# Patient Record
Sex: FEMALE | Race: BLACK OR AFRICAN AMERICAN | Employment: FULL TIME | URBAN - METROPOLITAN AREA
[De-identification: names, ages, dates, MRNs, and addresses within clinical notes are randomized per-mention and may not be internally consistent; named-entity substitution may affect disease eponyms.]

---

## 2020-12-02 ENCOUNTER — HOSPITAL ENCOUNTER (OUTPATIENT)
Dept: PHYSICAL THERAPY | Age: 30
Discharge: HOME | End: 2020-12-02
Payer: COMMERCIAL

## 2020-12-02 PROCEDURE — 97161 PT EVAL LOW COMPLEX 20 MIN: CPT | Performed by: PHYSICAL THERAPIST

## 2020-12-02 PROCEDURE — 97535 SELF CARE MNGMENT TRAINING: CPT | Performed by: PHYSICAL THERAPIST
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
## 2020-12-02 NOTE — PROGRESS NOTES
[facility] PHYSICAL THERAPY - DAILY TREATMENT NOTE    Patient Name: Carol Guerrero        Date: 2020  : 1990   yes Patient  Verified  Visit #:   1   of   9  Insurance: Payor: Britt Fink / Plan: Perfecto Mylo PPO / Product Type: PPO /      In time: 600 Out time: 630   Total Treatment Time: 30     Medicare/BCBS Time Tracking (below)   Total Timed Codes (min):  na 1:1 Treatment Time:  na     TREATMENT AREA =  Neck pain [M54.2]    SUBJECTIVE  Pain Level (on 0 to 10 scale):  7  / 10   Medication Changes/New allergies or changes in medical history, any new surgeries or procedures?    no  If yes, update Summary List   Subjective Functional Status/Changes:  []  No changes reported     See IE          OBJECTIVE        10 min Self Care: Activity modification, sleeping position    Rationale:    increase ROM and increase strength to improve the patients ability to complete adls    Billed With/As:   [] TE   [] TA   [] Neuro   [] Self Care Patient Education: [x] Review HEP    [] Progressed/Changed HEP based on:   [] positioning   [] body mechanics   [] transfers   [] heat/ice application    [] other:      Other Objective/Functional Measures:    Demo verbal understanding to [place]  See ie     Post Treatment Pain Level (on 0 to 10) scale:   7  / 10     ASSESSMENT  Assessment/Changes in Function:     See ie     []  See Progress Note/Recertification   Patient will continue to benefit from skilled PT services to modify and progress therapeutic interventions, address functional mobility deficits, address ROM deficits, address strength deficits, analyze and address soft tissue restrictions, analyze and cue movement patterns, analyze and modify body mechanics/ergonomics, assess and modify postural abnormalities and instruct in home and community integration to attain remaining goals.    Progress toward goals / Updated goals:    See ie     PLAN  []  Upgrade activities as tolerated yes Continue plan of care   []  Discharge due to :    [] Other:      Therapist: Emmett Livingston, PT    Date: 12/2/2020 Time: 6:40 PM     Future Appointments   Date Time Provider Marleny Ballesteros   12/7/2020  2:45 PM Roxana Weiner, PT [facility] [id number]   12/14/2020  8:30 AM John Napoles PTA [facility]   12/16/2020 11:45 AM Roxana Weiner, PT [facility]   1/4/2021  6:00 PM [facility]   1/6/2021  4:30 PM Roxana Weiner, PT [facility]   1/11/2021  6:00 PM Lasandra Collin [facility]   1/13/2021  4:30 PM Roxana Weiner, PT [facility]   1/18/2021  6:00 PM Roxana Weiner, PT [address]   1/20/2021  4:30 PM Jessica Hidalgo, PT [address]

## 2020-12-07 ENCOUNTER — HOSPITAL ENCOUNTER (OUTPATIENT)
Dept: PHYSICAL THERAPY | Age: 30
Discharge: HOME | End: 2020-12-07
Payer: COMMERCIAL

## 2020-12-07 PROCEDURE — 97140 MANUAL THERAPY 1/> REGIONS: CPT | Performed by: PHYSICAL THERAPIST

## 2020-12-07 PROCEDURE — 97110 THERAPEUTIC EXERCISES: CPT | Performed by: PHYSICAL THERAPIST

## 2020-12-07 NOTE — PROGRESS NOTES
[facility]   PHYSICAL THERAPY - DAILY TREATMENT NOTE    Patient Name: Eduardo Ramires        Date: 2020  : 1990   yes Patient  Verified  Visit #:   2   of   9  Insurance: Payor: Bhavin Rivera / Plan: Sury Haynes PPO / Product Type: PPO /      In time: 245 Out time: 333   Total Treatment Time: 48     Medicare/BCBS Time Tracking (below)   Total Timed Codes (min):  na 1:1 Treatment Time:  na     TREATMENT AREA =  Neck pain [M54.2]    SUBJECTIVE  Pain Level (on 0 to 10 scale):  7  / 10   Medication Changes/New allergies or changes in medical history, any new surgeries or procedures?    no  If yes, update Summary List   Subjective Functional Status/Changes:  []  No changes reported     I adjusted my monitor and have been trying to not make quick movements with my neck           OBJECTIVE  Modalities Rationale:     decrease pain and increase tissue extensibility to improve patient's ability to complete adls    min [] Estim, type/location:                                      []  att     []  unatt     []  w/US     []  w/ice    []  w/heat    min []  Mechanical Traction: type/lbs                   []  pro   []  sup   []  int   []  cont    []  before manual    []  after manual    min []  Ultrasound, settings/location:      min []  Iontophoresis w/ dexamethasone, location:                                               []  take home patch       []  in clinic   10 min [x]  Ice     []  Heat    location/position: Supine with bolster    min []  Vasopneumatic Device, press/temp:     min []  Other:    [x] Skin assessment post-treatment (if applicable):    [x]  intact    []  redness- no adverse reaction     []redness  adverse reaction:        12 min Therapeutic Exercise:  [x]  See flow sheet   Rationale:      increase ROM and increase strength to improve the patients ability to complete adls     26 min Manual Therapy: Mfr c/s paraspinals, ut, ls, scm, scalenes, sor   Rationale:      decrease pain, increase ROM and increase tissue extensibility to improve patient's ability to complete adls  The manual therapy interventions were performed at a separate and distinct time from the therapeutic activities interventions. Billed With/As:   [] TE   [] TA   [] Neuro   [] Self Care Patient Education: [x] Review HEP    [] Progressed/Changed HEP based on:   [] positioning   [] body mechanics   [] transfers   [] heat/ice application    [] other:      Other Objective/Functional Measures:    ttp along R SOR and B paraspinals, scm  Increased time for basic te to ensure form      Post Treatment Pain Level (on 0 to 10) scale:   6  / 10     ASSESSMENT  Assessment/Changes in Function:     No increase in pain following session but pt was advised on doms and demo verbal understanding of appropriate care     []  See Progress Note/Recertification   Patient will continue to benefit from skilled PT services to modify and progress therapeutic interventions, address functional mobility deficits, address ROM deficits, address strength deficits, analyze and address soft tissue restrictions, analyze and cue movement patterns, analyze and modify body mechanics/ergonomics, assess and modify postural abnormalities and instruct in home and community integration to attain remaining goals.    Progress toward goals / Updated goals:    Given hep - will assess compliance      PLAN  []  Upgrade activities as tolerated yes Continue plan of care   []  Discharge due to :    []  Other:      Therapist: Lacey Guillen PT    Date: 12/7/2020 Time: 1:54 PM     Future Appointments   Date Time Provider Marleny Ballesteros   12/7/2020  2:45 PM Gearlean Landau, PT [address]   12/14/2020  8:30 AM Naseem Mccoy PTA [facility]   12/16/2020 11:45 AM Gearlean Landau, PT [address]   1/4/2021  6:00 PM Luz Elena Oleksandr [facility]   1/6/2021  4:30 PM Gearlean Landau, PT [facility]   1/11/2021  6:00 PM Gearlean Landau, PT [facility]   1/13/2021  4:30 PM Alise Hidalgo, [address] 1/18/2021  6:00 PM Catalina Martínez, PT [facility]   1/20/2021  4:30 PM Bri Hidalgo, PT [facility]

## 2020-12-14 ENCOUNTER — HOSPITAL ENCOUNTER (OUTPATIENT)
Dept: PHYSICAL THERAPY | Age: 30
Discharge: HOME | End: 2020-12-14
Payer: COMMERCIAL

## 2020-12-14 PROCEDURE — 97110 THERAPEUTIC EXERCISES: CPT

## 2020-12-14 PROCEDURE — 97012 MECHANICAL TRACTION THERAPY: CPT

## 2020-12-14 NOTE — PROGRESS NOTES
PHYSICAL THERAPY - DAILY TREATMENT NOTE    Patient Name: Joanne Mart        Date: 2020  : 1990   yes Patient  Verified  Visit #:   3   of   9  Insurance: Payor: Medina Galaviz / Plan: [place] PPO / Product Type: PPO /      In time: 830 Out time: 212   Total Treatment Time: 45     Medicare/[facility] Time Tracking (below)   Total Timed Codes (min):  na 1:1 Treatment Time:  na     TREATMENT AREA =  Neck pain [M54.2]    SUBJECTIVE  Pain Level (on 0 to 10 scale):  3  / 10   Medication Changes/New allergies or changes in medical history, any new surgeries or procedures?    no  If yes, update Summary List   Subjective Functional Status/Changes:  []  No changes reported     Most pain is on (B) shoulder blade, (B) UT has sore spots. Been oding HEP once a day.           OBJECTIVE  Modalities Rationale:     decrease inflammation and decrease pain to improve patient's ability to complete adls    min [] Estim, type/location:                                      []  att     []  unatt     []  w/US     []  w/ice    []  w/heat    min []  Mechanical Traction: type/lbs                   []  pro   []  sup   []  int   []  cont    []  before manual    []  after manual    min []  Ultrasound, settings/location:      min []  Iontophoresis w/ dexamethasone, location:                                               []  take home patch       []  in clinic   10 min [x]  Ice     []  Heat    location/position: Supine with bolster    min []  Vasopneumatic Device, press/temp:     min []  Other:    [x] Skin assessment post-treatment (if applicable):    [x]  intact    []  redness- no adverse reaction     []redness  adverse reaction:        10 min Therapeutic Exercise:  [x]  See flow sheet   Rationale:      increase ROM and increase strength to improve the patients ability to complete adls      25 min Manual Therapy: PRONE: MFR Ut through upper T/S  Supine: tone reduction and pain modulation,      Rationale:      decrease pain, increase ROM and increase tissue extensibility to improve patient's ability to complete adls   The manual therapy interventions were performed at a separate and distinct time from the therapeutic activities interventions. Billed With/As:   [x] TE   [] TA   [] Neuro   [] Self Care Patient Education: [x] Review HEP    [] Progressed/Changed HEP based on:   [] positioning   [] body mechanics   [] transfers   [] heat/ice application    [] other:      Other Objective/Functional Measures:    Good tolerance to all manual treatment. TTP with MFR level 1. Increase muscle tensionoted along (R) UT region with manual.     Post Treatment Pain Level (on 0 to 10) scale:   5  / 10     ASSESSMENT  Assessment/Changes in Function:     Patient reporting increase (L)lower thoracic pain which goes to her (L) glute region as well. Patient denies HA today. []  See Progress Note/Recertification   Patient will continue to benefit from skilled PT services to modify and progress therapeutic interventions, address ROM deficits, address strength deficits, analyze and address soft tissue restrictions and analyze and cue movement patterns to attain remaining goals.    Progress toward goals / Updated goals:    STG 1 achieved     PLAN  []  Upgrade activities as tolerated yes Continue plan of care   []  Discharge due to :    []  Other:      Therapist: Patti Pugh PTA    Date: 12/14/2020 Time: 6:31 AM     Future Appointments   Date Time Provider Marleny Ballesteros   12/14/2020  8:30 AM BO[address]   12/16/2020 11:45 AM Mariam Stanley PT [facility]   1/4/2021  6:00 PM Mort Pleasure [facility]   1/6/2021  4:30 PM Mariam Decreastrid, PT [facility]   1/11/2021  6:00 PM Mort Pleasure [facility]   1/13/2021  4:30 PM Mariamsilvestre Stanley, PT [facility]   1/18/2021  6:00 PM Mariamsilvestre Stanley, PT [facility]   1/20/2021  4:30 PM Krys Hidalgo, PT [id number]

## 2020-12-16 ENCOUNTER — HOSPITAL ENCOUNTER (OUTPATIENT)
Dept: PHYSICAL THERAPY | Age: 30
Discharge: HOME | End: 2020-12-16
Payer: COMMERCIAL

## 2020-12-16 PROCEDURE — 97140 MANUAL THERAPY 1/> REGIONS: CPT | Performed by: PHYSICAL THERAPIST

## 2020-12-16 PROCEDURE — 97110 THERAPEUTIC EXERCISES: CPT | Performed by: PHYSICAL THERAPIST

## 2020-12-16 NOTE — PROGRESS NOTES
[facility]   PHYSICAL THERAPY - DAILY TREATMENT NOTE    Patient Name: Lias Menendez        Date: 2020  : 1990   yes Patient  Verified  Visit #:   4   of   9  Insurance: Payor: Kathy Sow / Plan: Latasha Alan PPO / Product Type: PPO /      In time: 1145 Out time: 98   Total Treatment Time: 53     Medicare/BCBS Time Tracking (below)   Total Timed Codes (min):  na 1:1 Treatment Time:  na     TREATMENT AREA =  Neck pain [M54.2]    SUBJECTIVE  Pain Level (on 0 to 10 scale):  4  / 10   Medication Changes/New allergies or changes in medical history, any new surgeries or procedures?    no  If yes, update Summary List   Subjective Functional Status/Changes:  []  No changes reported     I just feel sore in my upper trap           OBJECTIVE  Modalities Rationale:     decrease inflammation, decrease pain and increase tissue extensibility to improve patient's ability to complete adls   min [] Estim, type/location:                                      []  att     []  unatt     []  w/US     []  w/ice    []  w/heat    min []  Mechanical Traction: type/lbs                   []  pro   []  sup   []  int   []  cont    []  before manual    []  after manual    min []  Ultrasound, settings/location:      min []  Iontophoresis w/ dexamethasone, location:                                               []  take home patch       []  in clinic   10 min [x]  Ice     []  Heat    location/position: Supine with bolster    min []  Vasopneumatic Device, press/temp:     min []  Other:    [x] Skin assessment post-treatment (if applicable):    [x]  intact    []  redness- no adverse reaction     []redness  adverse reaction:        15 min Therapeutic Exercise:  [x]  See flow sheet   Rationale:      increase ROM and increase strength to improve the patients ability to complete adls     28 min Manual Therapy: Mfr/stm c/s paraspinals, ut, ls, B first rib depression, R SOR   Rationale:      decrease pain, increase ROM, increase tissue extensibility and decrease trigger points to improve patient's ability to complete adls  The manual therapy interventions were performed at a separate and distinct time from the therapeutic activities interventions. Billed With/As:   [] TE   [] TA   [] Neuro   [] Self Care Patient Education: [x] Review HEP    [] Progressed/Changed HEP based on:   [] positioning   [] body mechanics   [] transfers   [] heat/ice application    [] other:      Other Objective/Functional Measures:    ttp along B UT and R SOR  Added in t/s rotation with foam roll and required increased time to perform for appropriate mm stretch   Post Treatment Pain Level (on 0 to 10) scale:   6  / 10     ASSESSMENT  Assessment/Changes in Function:     Reported increased mm soreness following session along B UT     []  See Progress Note/Recertification   Patient will continue to benefit from skilled PT services to modify and progress therapeutic interventions, address functional mobility deficits, address ROM deficits, address strength deficits, analyze and address soft tissue restrictions, analyze and cue movement patterns, analyze and modify body mechanics/ergonomics, assess and modify postural abnormalities and instruct in home and community integration to attain remaining goals.    Progress toward goals / Updated goals:    Pt out of town x2 weeks      PLAN  []  Upgrade activities as tolerated yes Continue plan of care   []  Discharge due to :    []  Other:      Therapist: Joline Landau, PT    Date: 12/16/2020 Time: 10:19 AM     Future Appointments   Date Time Provider Marleny Ballesteros   12/16/2020 11:45 AM Jane Homans, PT [facility] [address]   1/4/2021  6:00 PM Ino Tracy [facility]   1/6/2021  4:30 PM [facility]ans, PT [facility]   1/11/2021  6:00 PM Ino Molina [facility]   1/13/2021  4:30 PM Jane Homans, PT [facility]   1/18/2021  6:00 PM Jane Homans, PT [facility]   1/20/2021  4:30 PM Selma Hidalgo PT [facility]

## 2021-01-04 ENCOUNTER — APPOINTMENT (OUTPATIENT)
Dept: PHYSICAL THERAPY | Age: 31
End: 2021-01-04
Payer: COMMERCIAL

## 2021-01-06 ENCOUNTER — APPOINTMENT (OUTPATIENT)
Dept: PHYSICAL THERAPY | Age: 31
End: 2021-01-06
Payer: COMMERCIAL

## 2021-01-11 ENCOUNTER — APPOINTMENT (OUTPATIENT)
Dept: PHYSICAL THERAPY | Age: 31
End: 2021-01-11
Payer: COMMERCIAL

## 2021-01-13 ENCOUNTER — HOSPITAL ENCOUNTER (OUTPATIENT)
Dept: PHYSICAL THERAPY | Age: 31
Discharge: HOME | End: 2021-01-13
Payer: COMMERCIAL

## 2021-01-13 PROCEDURE — 97140 MANUAL THERAPY 1/> REGIONS: CPT | Performed by: PHYSICAL THERAPIST

## 2021-01-13 PROCEDURE — 97110 THERAPEUTIC EXERCISES: CPT | Performed by: PHYSICAL THERAPIST
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
## 2021-01-13 NOTE — PROGRESS NOTES
Guillermo Ugarte   PHYSICAL THERAPY - DAILY TREATMENT NOTE    Patient Name: Mallory Leslie        Date: 2021  : 1990   yes Patient  Verified  Visit #:   5   of   9  Insurance: Payor: Sophie Ceballos / Plan: Alicia Haile [place] PPO / Product Type: PPO /      In time:  430 Out time: 530   Total Treatment Time: 60     Medicare/[facility] Time Tracking (below)   Total Timed Codes (min):  na 1:1 Treatment Time:  na     TREATMENT AREA =  Neck pain [M54.2]    SUBJECTIVE  Pain Level (on 0 to 10 scale):  3  / 10   Medication Changes/New allergies or changes in medical history, any new surgeries or procedures?    no  If yes, update Summary List   Subjective Functional Status/Changes:  []  No changes reported     See pn          OBJECTIVE  Modalities Rationale:     decrease inflammation, decrease pain and increase tissue extensibility to improve patient's ability to complete adls   min [] Estim, type/location:                                      []  att     []  unatt     []  w/US     []  w/ice    []  w/heat    min []  Mechanical Traction: type/lbs                   []  pro   []  sup   []  int   []  cont    []  before manual    []  after manual    min []  Ultrasound, settings/location:      min []  Iontophoresis w/ dexamethasone, location:                                               []  take home patch       []  in clinic   10 min [x]  Ice     []  Heat    location/position: Supine with bolster    min []  Vasopneumatic Device, press/temp:     min []  Other:    [x] Skin assessment post-treatment (if applicable):    [x]  intact    []  redness- no adverse reaction     []redness  adverse reaction:        25 min Therapeutic Exercise:  [x]  See flow sheet   Rationale:      increase ROM and increase strength to improve the patients ability to complete adls     25 min Manual Therapy: Mfr/stm c/s paraspinals, ut, ls, B first rib depression, R SOR   Rationale:      decrease pain, increase ROM, increase tissue extensibility and decrease trigger points to improve patient's ability to complete adls  The manual therapy interventions were performed at a separate and distinct time from the therapeutic activities interventions. Billed With/As:   [] TE   [] TA   [] Neuro   [] Self Care Patient Education: [x] Review HEP    [] Progressed/Changed HEP based on:   [] positioning   [] body mechanics   [] transfers   [] heat/ice application    [] other:      Other Objective/Functional Measures:    See pn    Post Treatment Pain Level (on 0 to 10) scale:   0  / 10     ASSESSMENT  Assessment/Changes in Function:     See pn      []  See Progress Note/Recertification   Patient will continue to benefit from skilled PT services to modify and progress therapeutic interventions, address functional mobility deficits, address ROM deficits, address strength deficits, analyze and address soft tissue restrictions, analyze and cue movement patterns, analyze and modify body mechanics/ergonomics, assess and modify postural abnormalities and instruct in home and community integration to attain remaining goals.    Progress toward goals / Updated goals:    See pn      PLAN  []  Upgrade activities as tolerated yes Continue plan of care   []  Discharge due to :    []  Other:      Therapist: Carie Jeong PT    Date: 1/13/2021 Time: 10:19 AM     Future Appointments   Date Time Provider Marleny Ballesteros   1/13/2021  4:30 PM [facility]   1/18/2021  6:00 PM Mariel Fan PT [facility]   1/20/2021  4:30 PM Christofer Hidalgo,  [address] [facility]

## 2021-01-18 ENCOUNTER — HOSPITAL ENCOUNTER (OUTPATIENT)
Dept: PHYSICAL THERAPY | Age: 31
Discharge: HOME | End: 2021-01-18
Payer: COMMERCIAL

## 2021-01-18 PROCEDURE — 97140 MANUAL THERAPY 1/> REGIONS: CPT | Performed by: PHYSICAL THERAPIST

## 2021-01-20 ENCOUNTER — HOSPITAL ENCOUNTER (OUTPATIENT)
Dept: PHYSICAL THERAPY | Age: 31
Discharge: HOME | End: 2021-01-20
Payer: COMMERCIAL

## 2021-01-20 ENCOUNTER — APPOINTMENT (OUTPATIENT)
Dept: PHYSICAL THERAPY | Age: 31
End: 2021-01-20
Payer: COMMERCIAL

## 2021-01-20 PROCEDURE — 97110 THERAPEUTIC EXERCISES: CPT

## 2021-01-20 PROCEDURE — 97140 MANUAL THERAPY 1/> REGIONS: CPT

## 2021-01-26 ENCOUNTER — HOSPITAL ENCOUNTER (OUTPATIENT)
Dept: PHYSICAL THERAPY | Age: 31
Discharge: HOME | End: 2021-01-26
Payer: COMMERCIAL

## 2021-01-26 PROCEDURE — 97110 THERAPEUTIC EXERCISES: CPT

## 2021-01-26 PROCEDURE — 97140 MANUAL THERAPY 1/> REGIONS: CPT

## 2021-01-26 NOTE — PROGRESS NOTES
PHYSICAL THERAPY - DAILY TREATMENT NOTE    Patient Name: Maribel Sandoval        Date: 2021  : 1990   yes Patient  Verified  Visit #:   8   of   9  Insurance: Payor: OPTIMA / [place] [employer] / Product Type: PPO /      In time: 4:33 pm  Out time: 5:29 pm   Total Treatment Time: 56     Medicare/[facility] Time Tracking (below)   Total Timed Codes (min):  46 1:1 Treatment Time:  na     TREATMENT AREA =  Neck pain [M54.2]    SUBJECTIVE  Pain Level (on 0 to 10 scale):  0  / 10   Medication Changes/New allergies or changes in medical history, any new surgeries or procedures?    no  If yes, update Summary List   Subjective Functional Status/Changes:  []  No changes reported     Patient reports she took a pain pill just a little bit ago so not feeling anything right now. Has been doing the exercises throughout day and noticing they are helping.  Got results back for MRI, no nerve involvement.        OBJECTIVE  Modalities Rationale:     decrease pain and increase tissue extensibility to improve patient's ability to tolerate prolonged sitting with decreased pain    min [] Estim, type/location:                                      []  att     []  unatt     []  w/US     []  w/ice    []  w/heat    min []  Mechanical Traction: type/lbs                   []  pro   []  sup   []  int   []  cont    []  before manual    []  after manual    min []  Ultrasound, settings/location:      min []  Iontophoresis w/ dexamethasone, location:                                               []  take home patch       []  in clinic   10 min []  Ice     [x]  Heat    location/position: c-s in supine with bolster     min []  Vasopneumatic Device, press/temp:     min []  Other:    [x] Skin assessment post-treatment (if applicable):    [x]  intact    []  redness- no adverse reaction     []redness – adverse reaction:        26 min Therapeutic Exercise:  [x]  See flow sheet   Rationale:      increase ROM, increase strength and improve  coordination to improve the patients ability to tolerate prolonged sitting with decreased pain      20 min Manual Therapy: MFR/STM to R c/s paraspinals, R UT/ lev scap; SOR   Rationale:      decrease pain, increase ROM, increase tissue extensibility and decrease trigger points to improve patient's ability to tolerate prolonged sitting with decreased pain   The manual therapy interventions were performed at a separate and distinct time from the therapeutic activities interventions. Billed With/As:   [x] TE   [] TA   [] Neuro   [] Self Care Patient Education: [x] Review HEP    [] Progressed/Changed HEP based on:   [] positioning   [] body mechanics   [] transfers   [] heat/ice application    [] other:      Other Objective/Functional Measures:    Significant tenderness & tightness through mid-cervical paraspinals, improved during manual with increased ability to tolerate pressure. No complaints of pain during therex. Post Treatment Pain Level (on 0 to 10) scale:   3-4  / 10     ASSESSMENT  Assessment/Changes in Function:     Patient reports increased stiffness at end of session. []  See Progress Note/Recertification   Patient will continue to benefit from skilled PT services to modify and progress therapeutic interventions, address functional mobility deficits, address ROM deficits, address strength deficits, analyze and address soft tissue restrictions, analyze and cue movement patterns and analyze and modify body mechanics/ergonomics to attain remaining goals. Progress toward goals / Updated goals:    No progress toward goals this session.       PLAN  [x]  Upgrade activities as tolerated yes Continue plan of care   []  Discharge due to :    []  Other:      Therapist: Sadi Pino PT, DPT     Date: 1/26/2021 Time: 4:34 PM     Future Appointments   Date Time Provider Marleny Ballesteros   2/3/2021  8:45 AM Noelle Britt PT [facility]   2/9/2021  2:45 PM Karyn Dominguez [facility]   2/11/2021 10:15 AM Luis Enrique Rogers,  [facility]   2/17/2021  4:30 PM Shen[place] [facility]   2/19/2021 10:15 AM Luis Enrique Rogers, PT [facility]   2/22/2021  2:45 PM Levi Linda [address] [facility]   2/24/2021  4:30 PM Shenandoah Linda [address] [facility]   3/1/2021  5:15 PM Luis Enrique Rogers,  [address] [facility]   3/3/2021  5:15 PM Andrew Hidalgo, PT [id number]

## 2021-02-03 ENCOUNTER — HOSPITAL ENCOUNTER (OUTPATIENT)
Dept: PHYSICAL THERAPY | Age: 31
Discharge: HOME | End: 2021-02-03
Payer: COMMERCIAL

## 2021-02-03 PROCEDURE — 97140 MANUAL THERAPY 1/> REGIONS: CPT | Performed by: PHYSICAL THERAPIST

## 2021-02-03 PROCEDURE — 97110 THERAPEUTIC EXERCISES: CPT | Performed by: PHYSICAL THERAPIST

## 2021-02-03 NOTE — PROGRESS NOTES
[facility]   PHYSICAL THERAPY - DAILY TREATMENT NOTE    Patient Name: Ernesto Sibley        Date: 2/3/2021  : 1990   yes Patient  Verified  Visit #:     Insurance: Payor: Vi Kim / [place] / Product Type: PPO /      In time: 845 Out time: 990   Total Treatment Time: 50     Medicare/[employer] Time Tracking (below)   Total Timed Codes (min):  na 1:1 Treatment Time:  na     TREATMENT AREA =  Neck pain [M54.2]    SUBJECTIVE  Pain Level (on 0 to 10 scale):  0  / 10   Medication Changes/New allergies or changes in medical history, any new surgeries or procedures?    no  If yes, update Summary List   Subjective Functional Status/Changes:  []  No changes reported     AFter about 1.5 hour sitting and 1 hour standing I feel pulling in my neck area - I would not exactly call it pain but pulling           OBJECTIVE  Modalities Rationale:     decrease pain and increase tissue extensibility to improve patient's ability to complete adls    min [] Estim, type/location:                                      []  att     []  unatt     []  w/US     []  w/ice    []  w/heat    min []  Mechanical Traction: type/lbs                   []  pro   []  sup   []  int   []  cont    []  before manual    []  after manual    min []  Ultrasound, settings/location:      min []  Iontophoresis w/ dexamethasone, location:                                               []  take home patch       []  in clinic   10 min []  Ice     [x]  Heat    location/position: Supine with bolster     min []  Vasopneumatic Device, press/temp:     min []  Other:    [x] Skin assessment post-treatment (if applicable):    [x]  intact    []  redness- no adverse reaction     []redness  adverse reaction:        25 min Therapeutic Exercise:  [x]  See flow sheet   Rationale:      increase ROM and increase strength to improve the patients ability to complete adls     15 min Manual Therapy: Grade iii pa mobts upper t/sm ribs 1-4 ap/pa mobs, dtm t/s paraspinals Rationale:      decrease pain, increase ROM, increase tissue extensibility and decrease trigger points to improve patient's ability to complete adls  The manual therapy interventions were performed at a separate and distinct time from the therapeutic activities interventions. Billed With/As:   [] TE   [] TA   [] Neuro   [] Self Care Patient Education: [x] Review HEP    [] Progressed/Changed HEP based on:   [] positioning   [] body mechanics   [] transfers   [] heat/ice application    [] other:      Other Objective/Functional Measures:    Significant loss of mid and upper thoracic pa and l rotation mobility, increased mm tone along ts paraspinals  te as per flow sheet to include t/s mobility - required increased time to perform to ensure form       Post Treatment Pain Level (on 0 to 10) scale:   2  / 10     ASSESSMENT  Assessment/Changes in Function:     Increase in mm soreness following session pt demo verbal understanding of how to self manage     []  See Progress Note/Recertification   Patient will continue to benefit from skilled PT services to modify and progress therapeutic interventions, address functional mobility deficits, address ROM deficits, address strength deficits, analyze and address soft tissue restrictions, analyze and cue movement patterns, analyze and modify body mechanics/ergonomics, assess and modify postural abnormalities and instruct in home and community integration to attain remaining goals.    Progress toward goals / Updated goals:    Progress with pain goals      PLAN  []  Upgrade activities as tolerated yes Continue plan of care   []  Discharge due to :    []  Other:      Therapist: Pao Martinez PT    Date: 2/3/2021 Time: 9:17 AM     Future Appointments   Date Time Provider Marleny Ballesteros   2/9/2021  2:45 PM [facility]   2/11/2021 10:15 AM [facility]   2/17/2021  4:30 PM ROHIT Carrera [facility]   2/19/2021 10:15 AM Gwen Max Sepulveda [facility]   2/22/2021  2:45 PM [facility]   2/24/2021  4:30 PM [facility]   3/1/2021  5:15 PM Anna Leggett, PT [facility]   3/3/2021  5:15 PM Jan Hidalgo, PT [id number]

## 2021-02-09 ENCOUNTER — HOSPITAL ENCOUNTER (OUTPATIENT)
Dept: PHYSICAL THERAPY | Age: 31
Discharge: HOME | End: 2021-02-09
Payer: COMMERCIAL

## 2021-02-09 PROCEDURE — 97110 THERAPEUTIC EXERCISES: CPT | Performed by: PHYSICAL THERAPIST

## 2021-02-09 PROCEDURE — 97140 MANUAL THERAPY 1/> REGIONS: CPT | Performed by: PHYSICAL THERAPIST

## 2021-02-09 NOTE — PROGRESS NOTES
Karen Trujillo   PHYSICAL THERAPY - DAILY TREATMENT NOTE    Patient Name: Kings Howard        Date: 2021  : 1990   yes Patient  Verified  Visit #:   10   of   12  Insurance: Payor: Arnaldo Waters / Plan: Ela Nathan PPO / Product Type: PPO /      In time: 245 Out time: 345   Total Treatment Time: 60     Medicare/[facility] Time Tracking (below)   Total Timed Codes (min):  na 1:1 Treatment Time:  na     TREATMENT AREA =  Neck pain [M54.2]    SUBJECTIVE  Pain Level (on 0 to 10 scale):  01 / 10   Medication Changes/New allergies or changes in medical history, any new surgeries or procedures?    no  If yes, update Summary List   Subjective Functional Status/Changes:  []  No changes reported     I took a personal day yesterday and not being in my office on the computer with less stress really seem to take away the spasms         OBJECTIVE  Modalities Rationale:     decrease pain and increase tissue extensibility to improve patient's ability to complete adls    min [] Estim, type/location:                                      []  att     []  unatt     []  w/US     []  w/ice    []  w/heat    min []  Mechanical Traction: type/lbs                   []  pro   []  sup   []  int   []  cont    []  before manual    []  after manual    min []  Ultrasound, settings/location:      min []  Iontophoresis w/ dexamethasone, location:                                               []  take home patch       []  in clinic   10 min []  Ice     [x]  Heat    location/position: Supine with bolster     min []  Vasopneumatic Device, press/temp:     min []  Other:    [x] Skin assessment post-treatment (if applicable):    [x]  intact    []  redness- no adverse reaction     []redness  adverse reaction:        35 min Therapeutic Exercise:  [x]  See flow sheet   Rationale:      increase ROM and increase strength to improve the patients ability to complete adls     15 min Manual Therapy: Grade iii pa mobs upper t/sm ribs 1-4 ap/pa mobs, dtm t/s paraspinals, grade iii pa mobs t7-9    Rationale:      decrease pain, increase ROM, increase tissue extensibility and decrease trigger points to improve patient's ability to complete adls  The manual therapy interventions were performed at a separate and distinct time from the therapeutic activities interventions. Billed With/As:   [] TE   [] TA   [] Neuro   [] Self Care Patient Education: [x] Review HEP    [] Progressed/Changed HEP based on:   [] positioning   [] body mechanics   [] transfers   [] heat/ice application    [] other:      Other Objective/Functional Measures:    Significant loss of mid and upper thoracic pa mobility specifically t7, progressed te as per flow sheet with increased time required to perform due to ensure form      Post Treatment Pain Level (on 0 to 10) scale:   2  / 10     ASSESSMENT  Assessment/Changes in Function:     Increase in mm soreness following session pt demo verbal understanding of how to self manage     []  See Progress Note/Recertification   Patient will continue to benefit from skilled PT services to modify and progress therapeutic interventions, address functional mobility deficits, address ROM deficits, address strength deficits, analyze and address soft tissue restrictions, analyze and cue movement patterns, analyze and modify body mechanics/ergonomics, assess and modify postural abnormalities and instruct in home and community integration to attain remaining goals.    Progress toward goals / Updated goals:    LTG #3 achieved      PLAN  []  Upgrade activities as tolerated yes Continue plan of care   []  Discharge due to :    []  Other:      Therapist: Jo-Ann Hale PT    Date: 2/9/2021 Time: 9:17 AM     Future Appointments   Date Time Provider Marleny Ballesteros   2/11/2021 10:15 AM Daphne Farias PT [facility]   2/17/2021  4:30 PM [facility]   2/19/2021 10:15 AM Daphne Farias PT [facility]   2/22/2021  2:45 PM [facility]   2/24/2021  4:30 PM Selvin Calderon [address] [facility]   3/1/2021  5:15 PM Teressa Key,  [facility]   3/3/2021  5:15 PM Maggie Hidalgo, PT [phone]

## 2021-02-11 ENCOUNTER — HOSPITAL ENCOUNTER (OUTPATIENT)
Dept: PHYSICAL THERAPY | Age: 31
Discharge: HOME | End: 2021-02-11
Payer: COMMERCIAL

## 2021-02-11 PROCEDURE — 97110 THERAPEUTIC EXERCISES: CPT | Performed by: PHYSICAL THERAPIST

## 2021-02-11 PROCEDURE — 97140 MANUAL THERAPY 1/> REGIONS: CPT | Performed by: PHYSICAL THERAPIST

## 2021-02-11 NOTE — PROGRESS NOTES
[facility] PHYSICAL THERAPY - DAILY TREATMENT NOTE    Patient Name: Zulema Elias        Date: 2021  : 1990   yes Patient  Verified  Visit #:     Insurance: Payor: Saint Bran / [place] / Product Type: PPO /      In time: 1015 Out time: 1100   Total Treatment Time: 45     Medicare/[facility] Time Tracking (below)   Total Timed Codes (min):  na 1:1 Treatment Time:  na     TREATMENT AREA =  Neck pain [M54.2]    SUBJECTIVE  Pain Level (on 0 to 10 scale):  0 / 10   Medication Changes/New allergies or changes in medical history, any new surgeries or procedures?    no  If yes, update Summary List   Subjective Functional Status/Changes:  []  No changes reported     See pn          OBJECTIVE      30 min Therapeutic Exercise:  [x]  See flow sheet   Rationale:      increase ROM and increase strength to improve the patients ability to complete adls     15 min Manual Therapy: Grade iii pa mobs upper t/sm ribs 1-4 ap/pa mobs, dtm t/s paraspinals, grade iii pa mobs t7-9    Rationale:      decrease pain, increase ROM, increase tissue extensibility and decrease trigger points to improve patient's ability to complete adls  The manual therapy interventions were performed at a separate and distinct time from the therapeutic activities interventions.         Billed With/As:   [] TE   [] TA   [] Neuro   [] Self Care Patient Education: [x] Review HEP    [] Progressed/Changed HEP based on:   [] positioning   [] body mechanics   [] transfers   [] heat/ice application    [] other:      Other Objective/Functional Measures:    See pn      Post Treatment Pain Level (on 0 to 10) scale:   2  / 10     ASSESSMENT  Assessment/Changes in Function:     See pn    []  See Progress Note/Recertification   Patient will continue to benefit from skilled PT services to modify and progress therapeutic interventions, address functional mobility deficits, address ROM deficits, address strength deficits, analyze and address soft tissue restrictions, analyze and cue movement patterns, analyze and modify body mechanics/ergonomics, assess and modify postural abnormalities and instruct in home and community integration to attain remaining goals.    Progress toward goals / Updated goals:    See pn      PLAN  []  Upgrade activities as tolerated yes Continue plan of care   []  Discharge due to :    []  Other:      Therapist: Kwesi Farley, PT    Date: 2/11/2021 Time: 9:17 AM     Future Appointments   Date Time Provider Marleny Ballesteros   2/17/2021  4:30 PM Jessi Prude [facility]   2/19/2021 10:15 AM Reyna Nicholas, PT [id number]   2/22/2021  2:45 PM Jessi Prude [id number]   2/24/2021  4:30 PM Jessi Prude [facility]   3/1/2021  5:15 PM Reyna Nicholas PT [facility]   3/3/2021  5:15 PM Carlo Hidalgo, PT [facility]

## 2021-02-11 NOTE — PROGRESS NOTES
..[address] [place] [address] PHYSICAL THERAPY  [address] [place] [address] 201,[facility], [facility] - Phone: [phone]  Fax: [phone]  PROGRESS NOTE  Patient Name: Melanie Plaat : 1990   Treatment/Medical Diagnosis: Neck pain [M54.2]   Referral Source: Alecia Jason MD     Date of Initial Visit: 20 Attended Visits: 11 Missed Visits: See below     SUMMARY OF TREATMENT  Pt was seen for IE and 10 f/u visits with treatment consisting of therapeutic exercises for cervical/thoracic mobility, manual therapy (prom/mobs/stm) and modalities prn. In addition pt was instructed on hep/activity modification. CURRENT STATUS  Pt has been able to consistently attend therapy following the holidays with good results thus far. Currently rates her pain 4/10 at worst with prolonged sitting at the computer Denies any similar episodes of sharp pain since that day. She has restored c/s arom wfl all directions with \"tightness reported at end range extension. Now that pain/rom have improved emphasis has been placed on strengthening. Current shoulder strength 4-/5 globally     Goal/Measure of Progress Goal Met? 1. Restore full c/s rotation wnl to A with adls such as driving   Status at last Eval: l rot 66, rr 75 Current Status: L rot 75, Rr 80 yes   2. Pt will note daily max pain </=3/10 in order to increase participation in adls   Status at last Eval: 9/10 Current Status: 4/10 progressing   3. Pt will report +5 on GROC in order to show functional improvement   Status at last Eval: na Current Status: +5 yes     New Goals to be achieved in __4__  weeks:  1. Achieve goal #2  2. Pt will demonstrate 4+/5 shoulder strength in order to improve ability to reach/lift/sit  3. I with advanced hep in order to prepare for d/c   RECOMMENDATIONS  Continue therapy an additional 2x/4 weeks resuming prior POC   If you have any questions/comments please contact us directly at [phone].    Thank you for allowing us to assist in the care of your patient. Therapist Signature: Nikki Rodriguez PT Date: 2/11/2021     Time: 6:18 PM   NOTE TO PHYSICIAN:  PLEASE COMPLETE THE ORDERS BELOW AND FAX TO   [facility]: [phone]  If you are unable to process this request in 24 hours please contact our office: [phone]    ___ I have read the above report and request that my patient continue as recommended.   ___ I have read the above report and request that my patient continue therapy with the following changes/special instructions:_________________________________________________________   ___ I have read the above report and request that my patient be discharged from therapy.      Physician Signature:        Date:       Time:

## 2021-02-17 ENCOUNTER — HOSPITAL ENCOUNTER (OUTPATIENT)
Dept: PHYSICAL THERAPY | Age: 31
Discharge: HOME | End: 2021-02-17
Payer: COMMERCIAL

## 2021-02-17 PROCEDURE — 97140 MANUAL THERAPY 1/> REGIONS: CPT | Performed by: PHYSICAL THERAPIST

## 2021-02-17 PROCEDURE — 97110 THERAPEUTIC EXERCISES: CPT | Performed by: PHYSICAL THERAPIST

## 2021-02-17 NOTE — PROGRESS NOTES
Sharif Felder PHYSICAL THERAPY - DAILY TREATMENT NOTE    Patient Name: Hang Glades        Date: 2021  : 1990   yes Patient  Verified  Visit #:     Insurance: Payor: Valentino Colón / [place]: [place] [employer] / Product Type: PPO /      In time: 435 Out time: 515   Total Treatment Time: 40     Medicare/[employer] Time Tracking (below)   Total Timed Codes (min):  na 1:1 Treatment Time:  na     TREATMENT AREA =  Neck pain [M54.2]    SUBJECTIVE  Pain Level (on 0 to 10 scale):  1 / 10   Medication Changes/New allergies or changes in medical history, any new surgeries or procedures?    no  If yes, update Summary List   Subjective Functional Status/Changes:  []  No changes reported     I changed up my work desk and now I am facing the opposite direction     OBJECTIVE      25 min Therapeutic Exercise:  [x]  See flow sheet   Rationale:      increase ROM and increase strength to improve the patients ability to complete adls     15 min Manual Therapy: Grade iii pa mobs upper t/sm ribs 1-4 ap/pa mobs, dtm t/s paraspinals, grade iii pa mobs t7-9    Rationale:      decrease pain, increase ROM, increase tissue extensibility and decrease trigger points to improve patient's ability to complete adls  The manual therapy interventions were performed at a separate and distinct time from the therapeutic activities interventions.         Billed With/As:   [] TE   [] TA   [] Neuro   [] Self Care Patient Education: [x] Review HEP    [] Progressed/Changed HEP based on:   [] positioning   [] body mechanics   [] transfers   [] heat/ice application    [] other:      Other Objective/Functional Measures:    Increased tenderness along L mid ts paraspinals  Increased te as per flow sheet with cues required 100% to ensure form    Post Treatment Pain Level (on 0 to 10) scale:   0 / 10     ASSESSMENT  Assessment/Changes in Function:   No increase in pain following session      []  See Progress Note/Recertification   Patient will continue to benefit from skilled PT services to modify and progress therapeutic interventions, address functional mobility deficits, address ROM deficits, address strength deficits, analyze and address soft tissue restrictions, analyze and cue movement patterns, analyze and modify body mechanics/ergonomics, assess and modify postural abnormalities and instruct in home and community integration to attain remaining goals.    Progress toward goals / Updated goals:    Progress with pain reduction      PLAN  []  Upgrade activities as tolerated yes Continue plan of care   []  Discharge due to :    []  Other:      Therapist: Nagi Jarvis PT    Date: 2/17/2021 Time: 9:17 AM     Future Appointments   Date Time Provider Marleny Ballesteros   2/17/2021  4:30 PM [facility]   2/19/2021 10:15 AM Jonatan Lennon, PT [facility]   2/22/2021  2:45 PM [facility]   2/24/2021  4:30 PM Melonie Fritter [facility]   3/1/2021  5:15 PM Jonatan Lennon PT [facility]   3/3/2021  5:15 PM Viky Hidalgo, PT [facility]

## 2021-02-19 ENCOUNTER — HOSPITAL ENCOUNTER (OUTPATIENT)
Dept: PHYSICAL THERAPY | Age: 31
Discharge: HOME | End: 2021-02-19
Payer: COMMERCIAL

## 2021-02-19 PROCEDURE — 97140 MANUAL THERAPY 1/> REGIONS: CPT | Performed by: PHYSICAL THERAPIST

## 2021-02-19 PROCEDURE — 97110 THERAPEUTIC EXERCISES: CPT | Performed by: PHYSICAL THERAPIST

## 2021-02-19 NOTE — PROGRESS NOTES
Danielle Ulrich PHYSICAL THERAPY - DAILY TREATMENT NOTE    Patient Name: Nai Arzate        Date: 2021  : 1990   yes Patient  Verified  Visit #:   15   of   18  Insurance: Payor: Simón Holm / [place]: [place] [employer] / Product Type: PPO /      In time: 1012 Out time: 1050   Total Treatment Time: 38     Medicare/[facility] Time Tracking (below)   Total Timed Codes (min):  na 1:1 Treatment Time:  na     TREATMENT AREA =  Neck pain [M54.2]    SUBJECTIVE  Pain Level (on 0 to 10 scale):  1 / 10   Medication Changes/New allergies or changes in medical history, any new surgeries or procedures?    no  If yes, update Summary List   Subjective Functional Status/Changes:  []  No changes reported     My work set up got changed again now I cannot adjust the height of my computer at all or stand     OBJECTIVE      18 min Therapeutic Exercise:  [x]  See flow sheet   Rationale:      increase ROM and increase strength to improve the patients ability to complete adls     15 min Manual Therapy: Grade iii pa mobs upper t/sm ribs 1-4 ap/pa mobs, dtm t/s paraspinals, grade iii pa mobs t7-9    Rationale:      decrease pain, increase ROM, increase tissue extensibility and decrease trigger points to improve patient's ability to complete adls  The manual therapy interventions were performed at a separate and distinct time from the therapeutic activities interventions.         Billed With/As:   [] TE   [] TA   [] Neuro   [] Self Care Patient Education: [x] Review HEP    [] Progressed/Changed HEP based on:   [] positioning   [] body mechanics   [] transfers   [] heat/ice application    [] other:      Other Objective/Functional Measures:  Pt brought in picture of work set up and monitor just below eye height causing slight cervical flexion and increased posterior chain tightness, increased mm tone noted at L mid to lwoer paraspinals    Post Treatment Pain Level (on 0 to 10) scale:   0 / 10     ASSESSMENT  Assessment/Changes in Function:   No increase in pain following session      []  See Progress Note/Recertification   Patient will continue to benefit from skilled PT services to modify and progress therapeutic interventions, address functional mobility deficits, address ROM deficits, address strength deficits, analyze and address soft tissue restrictions, analyze and cue movement patterns, analyze and modify body mechanics/ergonomics, assess and modify postural abnormalities and instruct in home and community integration to attain remaining goals.    Progress toward goals / Updated goals:    Progress with pain reduction overall but inability to change ergonomic set up could prevent further progression      PLAN  []  Upgrade activities as tolerated yes Continue plan of care   []  Discharge due to :    []  Other:      Therapist: Gaye Santiago, PT    Date: 2/19/2021 Time: 9:17 AM     Future Appointments   Date Time Provider Marleny Ballesteros   2/22/2021  2:45 PM Wong Lindsay [facility]   2/24/2021  4:30 PM [id number]   3/1/2021  5:15 PM Prabhakar Alexander, PT [facility]   3/3/2021  5:15 PM Chrystal Hidalgo, PT [facility]

## 2021-02-22 ENCOUNTER — HOSPITAL ENCOUNTER (OUTPATIENT)
Dept: PHYSICAL THERAPY | Age: 31
Discharge: HOME | End: 2021-02-22
Payer: COMMERCIAL

## 2021-02-22 PROCEDURE — 97110 THERAPEUTIC EXERCISES: CPT | Performed by: PHYSICAL THERAPIST

## 2021-02-22 PROCEDURE — 97140 MANUAL THERAPY 1/> REGIONS: CPT | Performed by: PHYSICAL THERAPIST

## 2021-02-22 NOTE — PROGRESS NOTES
Tone Smith PHYSICAL THERAPY - DAILY TREATMENT NOTE    Patient Name: Emily Corona        Date: 2021  : 1990   yes Patient  Verified  Visit #:   15   of   18  Insurance: Payor: [place] / Plan: [place] [employer] / Product Type: PPO /      In time: 243 Out time: 321   Total Treatment Time: 38     Medicare/[employer]BS Time Tracking (below)   Total Timed Codes (min):  na 1:1 Treatment Time:  na     TREATMENT AREA =  Neck pain [M54.2]    SUBJECTIVE  Pain Level (on 0 to 10 scale):  3 / 10   Medication Changes/New allergies or changes in medical history, any new surgeries or procedures?    no  If yes, update Summary List   Subjective Functional Status/Changes:  []  No changes reported     I actually dont feel too bad      OBJECTIVE      25 min Therapeutic Exercise:  [x]  See flow sheet   Rationale:      increase ROM and increase strength to improve the patients ability to complete adls     13 min Manual Therapy: Grade iii pa mobs upper t/sm ribs 1-4 ap/pa mobs, dtm t/s paraspinals, grade iii pa mobs t7-9    Rationale:      decrease pain, increase ROM, increase tissue extensibility and decrease trigger points to improve patient's ability to complete adls  The manual therapy interventions were performed at a separate and distinct time from the therapeutic activities interventions.         Billed With/As:   [] TE   [] TA   [] Neuro   [] Self Care Patient Education: [x] Review HEP    [] Progressed/Changed HEP based on:   [] positioning   [] body mechanics   [] transfers   [] heat/ice application    [] other:      Other Objective/Functional Measures:  ttp along mid t/s paraspinals with dtm reproduced L sided neck pain - resolved after mt  Progressed te as per flow sheet with good form    Post Treatment Pain Level (on 0 to 10) scale:   0 / 10     ASSESSMENT  Assessment/Changes in Function:     No increase in pain following session    []  See Progress Note/Recertification   Patient will continue to benefit from skilled PT services to modify and progress therapeutic interventions, address functional mobility deficits, address ROM deficits, address strength deficits, analyze and address soft tissue restrictions, analyze and cue movement patterns, analyze and modify body mechanics/ergonomics, assess and modify postural abnormalities and instruct in home and community integration to attain remaining goals.    Progress toward goals / Updated goals:    Pt agreed to continue with remaining scheduled visits with anticipation of d/c at that time due to maximum gains with therapy achieved      PLAN  []  Upgrade activities as tolerated yes Continue plan of care   []  Discharge due to :    []  Other:      Therapist: Vero Dejesus PT    Date: 2/22/2021 Time: 9:17 AM     Future Appointments   Date Time Provider Marleny Ballesteros   2/22/2021  2:45 PM Koffi Almonte [address] [facility]   2/24/2021  4:30 PM Koffi Almonte [address] [facility]   3/1/2021  5:15 PM Raul Harris,  [address] [facility]   3/3/2021  5:15 PM Raul Harris,  [address] [facility]

## 2021-02-24 ENCOUNTER — HOSPITAL ENCOUNTER (OUTPATIENT)
Dept: PHYSICAL THERAPY | Age: 31
Discharge: HOME | End: 2021-02-24
Payer: COMMERCIAL

## 2021-02-24 PROCEDURE — 97140 MANUAL THERAPY 1/> REGIONS: CPT | Performed by: PHYSICAL THERAPIST

## 2021-02-24 PROCEDURE — 97110 THERAPEUTIC EXERCISES: CPT | Performed by: PHYSICAL THERAPIST

## 2021-02-24 NOTE — PROGRESS NOTES
Carlos Cobb PHYSICAL THERAPY - DAILY TREATMENT NOTE    Patient Name: Eve Avelar        Date: 2021  : 1990   yes Patient  Verified  Visit #:     Insurance: Payor: Ramiro Higginbotham / Plan: Milena [place] PPO / Product Type: PPO /       In time: 430 Out time: 512   Total Treatment Time: 42     Medicare/[employer]BS Time Tracking (below)   Total Timed Codes (min):  na 1:1 Treatment Time:  na     TREATMENT AREA =  Neck pain [M54.2]    SUBJECTIVE  Pain Level (on 0 to 10 scale):  2 / 10   Medication Changes/New allergies or changes in medical history, any new surgeries or procedures?    no  If yes, update Summary List   Subjective Functional Status/Changes:  []  No changes reported   Just tight in my mid back area        OBJECTIVE      32 min Therapeutic Exercise:  [x]  See flow sheet   Rationale:      increase ROM and increase strength to improve the patients ability to complete adls     10 min Manual Therapy: Grade iii pa mobs upper t/sm ribs 1-4 ap/pa mobs, dtm t/s paraspinals, grade iii pa mobs t7-9    Rationale:      decrease pain, increase ROM, increase tissue extensibility and decrease trigger points to improve patient's ability to complete adls  The manual therapy interventions were performed at a separate and distinct time from the therapeutic activities interventions.         Billed With/As:   [] TE   [] TA   [] Neuro   [] Self Care Patient Education: [x] Review HEP    [] Progressed/Changed HEP based on:   [] positioning   [] body mechanics   [] transfers   [] heat/ice application    [] other:      Other Objective/Functional Measures:  ttp along t7-9 paraspinals otherwise unremarkable with mt  te as per flow sheet with fatigue during push ups   Post Treatment Pain Level (on 0 to 10) scale:   0 / 10     ASSESSMENT  Assessment/Changes in Function:   No increase in pain      []  See Progress Note/Recertification   Patient will continue to benefit from skilled PT services to modify and progress therapeutic interventions, address functional mobility deficits, address ROM deficits, address strength deficits, analyze and address soft tissue restrictions, analyze and cue movement patterns, analyze and modify body mechanics/ergonomics, assess and modify postural abnormalities and instruct in home and community integration to attain remaining goals. Progress toward goals / Updated goals:    Pain goal achieved this session.  Pt advised to attempt elliptical prior to next session to address pt goal of returning to plof/gym activities      PLAN  []  Upgrade activities as tolerated yes Continue plan of care   []  Discharge due to :    []  Other:      Therapist: Kelsi Gutierres PT    Date: 2/24/2021 Time: 9:17 AM     Future Appointments   Date Time Provider Marleny Ballesteros   2/24/2021  4:30 PM Rosemary Haskins [facility]   3/1/2021  5:15 PM JuanM Lopez, ROHIT [facility]   3/3/2021  5:15 PM Rossy Hidalgo, PT [phone]

## 2021-03-01 ENCOUNTER — HOSPITAL ENCOUNTER (OUTPATIENT)
Dept: PHYSICAL THERAPY | Age: 31
Discharge: HOME | End: 2021-03-01
Payer: COMMERCIAL

## 2021-03-01 PROCEDURE — 97110 THERAPEUTIC EXERCISES: CPT | Performed by: PHYSICAL THERAPIST

## 2021-03-01 PROCEDURE — 97140 MANUAL THERAPY 1/> REGIONS: CPT | Performed by: PHYSICAL THERAPIST

## 2021-03-01 NOTE — PROGRESS NOTES
Jeanine Cespedes PHYSICAL THERAPY - DAILY TREATMENT NOTE    Patient Name: Vickie Hudson        Date: 3/1/2021  : 1990   yes Patient  Verified  Visit #:     Insurance: Payor: Vena Duane / Plan: [address] PPO / Product Type: PPO /       In time: 508 Out time: 548   Total Treatment Time: 40     Medicare/[facility] Time Tracking (below)   Total Timed Codes (min):  na 1:1 Treatment Time:  na     TREATMENT AREA =  Neck pain [M54.2]    SUBJECTIVE  Pain Level (on 0 to 10 scale):  2 / 10   Medication Changes/New allergies or changes in medical history, any new surgeries or procedures?    no  If yes, update Summary List   Subjective Functional Status/Changes:  []  No changes reported   I am just feeling tight. I am comfortable with Wednesday being my last day     OBJECTIVE      15 min Therapeutic Exercise:  [x]  See flow sheet   Rationale:      increase ROM and increase strength to improve the patients ability to complete adls     25 min Manual Therapy: Dtm c/s paraspinals, utt, sor, man tx, grade iii pa mobs c5-7   Rationale:      decrease pain, increase ROM, increase tissue extensibility and decrease trigger points to improve patient's ability to complete adls  The manual therapy interventions were performed at a separate and distinct time from the therapeutic activities interventions.         Billed With/As:   [] TE   [] TA   [] Neuro   [] Self Care Patient Education: [x] Review HEP    [] Progressed/Changed HEP based on:   [] positioning   [] body mechanics   [] transfers   [] heat/ice application    [] other:      Other Objective/Functional Measures:  ttp along R upper c/s paraspinals and ut, palpable trp along R SOR   te as per flow sheet    Post Treatment Pain Level (on 0 to 10) scale:   0 / 10     ASSESSMENT  Assessment/Changes in Function:   Reduction in pain after session      []  See Progress Note/Recertification   Patient will continue to benefit from skilled PT services to modify and progress therapeutic interventions, address functional mobility deficits, address ROM deficits, address strength deficits, analyze and address soft tissue restrictions, analyze and cue movement patterns, analyze and modify body mechanics/ergonomics, assess and modify postural abnormalities and instruct in home and community integration to attain remaining goals.    Progress toward goals / Updated goals:  Pt agreed to d/c next session due to maximum gains with PT       PLAN  []  Upgrade activities as tolerated yes Continue plan of care   []  Discharge due to :    []  Other:      Therapist: Heriberto Tran PT    Date: 3/1/2021 Time: 9:17 AM     Future Appointments   Date Time Provider Marleny Ballesteros   3/1/2021  5:15 PM Starlette Number, PT [facility]   3/3/2021  5:15 PM Starlette Number, PT [facility]

## 2021-03-03 ENCOUNTER — HOSPITAL ENCOUNTER (OUTPATIENT)
Dept: PHYSICAL THERAPY | Age: 31
End: 2021-03-03
Payer: COMMERCIAL

## 2024-08-03 NOTE — PROGRESS NOTES
Sunni Goldberg PHYSICAL THERAPY - DAILY TREATMENT NOTE    Patient Name: Dieter Castaneda        Date: 2021  : 1990   yes Patient  Verified  Visit #:   6   of   9  Insurance: Payor: Cindy Gutierrez / Plan: [place] / Product Type: PPO /      In time: 552  Out time: 628   Total Treatment Time: 36     Medicare/[facility] Time Tracking (below)   Total Timed Codes (min):  na 1:1 Treatment Time:  na     TREATMENT AREA =  Neck pain [M54.2]    SUBJECTIVE  Pain Level (on 0 to 10 scale):  4  / 10   Medication Changes/New allergies or changes in medical history, any new surgeries or procedures?    no  If yes, update Summary List   Subjective Functional Status/Changes:  []  No changes reported     By the end of the work day on Friday I am just really sore and have a HA.  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 so that just annoys my [place] by then end of the week        OBJECTIVE  Modalities Rationale:     decrease inflammation, decrease pain and increase tissue extensibility to improve patient's ability to complete adls   min [] Estim, type/location:                                      []  att     []  unatt     []  w/US     []  w/ice    []  w/heat    min []  Mechanical Traction: type/lbs                   []  pro   []  sup   []  int   []  cont    []  before manual    []  after manual    min []  Ultrasound, settings/location:      min []  Iontophoresis w/ dexamethasone, location:                                               []  take home patch       []  in clinic   10 min [x]  Ice     []  Heat    location/position: Supine with bolster    min []  Vasopneumatic Device, press/temp:     min []  Other:    [x] Skin assessment post-treatment (if applicable):    [x]  intact    []  redness- no adverse reaction     []redness  adverse reaction:          26 min Manual Therapy: Mfr/stm c/s paraspinals, ut, ls, B first rib depression, R SOR   Rationale:      decrease pain, increase ROM, increase tissue extensibility and decrease trigger points to improve patient's ability to complete adls  The manual therapy interventions were performed at a separate and distinct time from the therapeutic activities interventions. Billed With/As:   [] TE   [] TA   [] Neuro   [] Self Care Patient Education: [x] Review HEP    [] Progressed/Changed HEP based on:   [] positioning   [] body mechanics   [] transfers   [] heat/ice application    [] other:      Other Objective/Functional Measures:  Pt completed te just prior to session so performed MT only  ttp along B SOR increasing with c/s ext L rotation  Able to reproduce a HA with palpation to R SOR     Post Treatment Pain Level (on 0 to 10) scale:   2  / 10     ASSESSMENT  Assessment/Changes in Function:      ergonomic set up contributing to repetitive neck motions that exacerbate HA and pain - pt states unable to modify any further and this will likely impact long term relief     []  See Progress Note/Recertification   Patient will continue to benefit from skilled PT services to modify and progress therapeutic interventions, address functional mobility deficits, address ROM deficits, address strength deficits, analyze and address soft tissue restrictions, analyze and cue movement patterns, analyze and modify body mechanics/ergonomics, assess and modify postural abnormalities and instruct in home and community integration to attain remaining goals.    Progress toward goals / Updated goals:  Slow progress with carry over of pain      PLAN  []  Upgrade activities as tolerated yes Continue plan of care   []  Discharge due to :    []  Other:      Therapist: Jo-Ann Hale PT    Date: 1/18/2021 Time: 10:19 AM     Future Appointments   Date Time Provider Marleny Ballesteros   1/18/2021  6:00 PM Lenard Leslie [facility]   1/20/2021  4:30 PM Beatriz Morin no